# Patient Record
Sex: MALE | Race: WHITE | URBAN - METROPOLITAN AREA
[De-identification: names, ages, dates, MRNs, and addresses within clinical notes are randomized per-mention and may not be internally consistent; named-entity substitution may affect disease eponyms.]

---

## 2022-12-22 ENCOUNTER — OFFICE VISIT (OUTPATIENT)
Dept: URBAN - METROPOLITAN AREA CLINIC 33 | Facility: CLINIC | Age: 52
End: 2022-12-22
Payer: COMMERCIAL

## 2022-12-22 DIAGNOSIS — H00.19 CHALAZION UNSPECIFIED EYE, UNSPECIFIED EYELID: Primary | ICD-10-CM

## 2022-12-22 DIAGNOSIS — H02.889 MEIBOMIAN GLAND DYSFUNCTION OF EYE: ICD-10-CM

## 2022-12-22 DIAGNOSIS — H04.123 DRY EYE SYNDROME OF BILATERAL LACRIMAL GLANDS: ICD-10-CM

## 2022-12-22 PROCEDURE — 92004 COMPRE OPH EXAM NEW PT 1/>: CPT

## 2022-12-22 ASSESSMENT — VISUAL ACUITY
OD: 20/20
OS: 20/20

## 2022-12-22 ASSESSMENT — INTRAOCULAR PRESSURE
OS: 13
OD: 12

## 2022-12-22 NOTE — IMPRESSION/PLAN
Impression: Chalazion unspecified eye, unspecified eyelid: H00.19.
-has two chalazion on lower lids OD, OS
-been there for about a year
-failed treatment with IPL, antiobiotic ointment, warm compresses, and doxycycline
-no ucleration, feeder vessels, or blood Plan: Patient educated on findings. Recommend patient to continue to use artificial tears and perform warm compresses with digital massage BID OU. Discussed procedures to remove content. Patient would like to have a consult with oculoplastic surgeon. Return to clinic if patient notes lesion bleeds, ulcerates, or enlarge in size.

## 2022-12-28 ENCOUNTER — OFFICE VISIT (OUTPATIENT)
Dept: URBAN - METROPOLITAN AREA CLINIC 33 | Facility: CLINIC | Age: 52
End: 2022-12-28
Payer: COMMERCIAL

## 2022-12-28 DIAGNOSIS — L71.9 ROSACEA: ICD-10-CM

## 2022-12-28 DIAGNOSIS — H00.12 CHALAZION RIGHT LOWER EYELID: Primary | ICD-10-CM

## 2022-12-28 PROCEDURE — 99203 OFFICE O/P NEW LOW 30 MIN: CPT | Performed by: OPHTHALMOLOGY

## 2022-12-28 PROCEDURE — 92285 EXTERNAL OCULAR PHOTOGRAPHY: CPT | Performed by: OPHTHALMOLOGY

## 2022-12-28 ASSESSMENT — INTRAOCULAR PRESSURE
OD: 12
OS: 12

## 2022-12-28 NOTE — IMPRESSION/PLAN
Impression: Chalazion right lower eyelid: H00.12. Plan: Patient examined. Chart reviewed. Patient has signs, symptoms and findings consistent with chronic chalazia. This was diagrammatically described. Patient voiced understanding. Discussed known options for management (do nothing, conservative management, steroid injection where appropriate,  and/or surgical intervention.) Risks discussed: bleeding, infection, dry eye, asymmetry, numbness, and/or need for further surgery. Patient elects surgical intervention at this time. RV 3-4 weeks for office procedure ([id number] - Excision of chalazion)

## 2022-12-28 NOTE — IMPRESSION/PLAN
Impression: Rosacea: L71.9. Plan: Patient examined. Chart reviewed. Patient has findings of rosacea. Currently being treated with medication.

## 2023-01-19 ENCOUNTER — OFFICE VISIT (OUTPATIENT)
Dept: URBAN - METROPOLITAN AREA CLINIC 33 | Facility: CLINIC | Age: 53
End: 2023-01-19
Payer: COMMERCIAL

## 2023-01-19 DIAGNOSIS — H00.12 CHALAZION RIGHT LOWER EYELID: Primary | ICD-10-CM

## 2023-01-19 DIAGNOSIS — L71.9 ROSACEA: ICD-10-CM

## 2023-01-19 PROCEDURE — 67800 REMOVE EYELID LESION: CPT | Performed by: OPHTHALMOLOGY

## 2023-01-19 RX ORDER — NEOMYCIN SULFATE, POLYMYXIN B SULFATE AND DEXAMETHASONE 3.5; 10000; 1 MG/ML; [USP'U]/ML; MG/ML
SUSPENSION/ DROPS OPHTHALMIC
Qty: 5 | Refills: 1 | Status: ACTIVE
Start: 2023-01-19

## 2023-01-19 RX ORDER — DOXYCYCLINE HYCLATE 100 MG/1
100 MG TABLET, COATED ORAL
Qty: 90 | Refills: 0 | Status: ACTIVE
Start: 2023-01-19

## 2023-01-19 ASSESSMENT — INTRAOCULAR PRESSURE
OS: 12
OD: 12

## 2023-01-19 NOTE — IMPRESSION/PLAN
Impression: Chalazion right lower eyelid: H00.12. Plan: Patient examined. Chart reviewed. Patient has signs, symptoms and findings consistent with chronic chalazia. This was diagrammatically described. Patient voiced understanding. Discussed known options for management (do surgical intervention.) Risks discussed: bleeding, infection, dry eye, asymmetry, numbness, and/or need for further surgery. Patient elects surgical intervention at this time.  

93216 - Excision of chalazion, multiple, same eyelid Mod E4